# Patient Record
(demographics unavailable — no encounter records)

---

## 2024-10-09 NOTE — PHYSICAL EXAM
[No Acute Distress] : no acute distress [No Respiratory Distress] : no respiratory distress  [Normal Rate] : normal rate  [Normal Affect] : the affect was normal [Normal Insight/Judgement] : insight and judgment were intact

## 2024-10-09 NOTE — HISTORY OF PRESENT ILLNESS
[FreeTextEntry1] : f/u elevated BP [de-identified] : very pleasant 41yo M presenting for follow up of his elevated BP reading. no acute concerns pt's CT chest showed a 0.6cm pulmonary nodule, nonspecific. they do recommend follow up in 3months a1c is elevated lipid panel abnormal

## 2024-10-30 NOTE — HISTORY OF PRESENT ILLNESS
[FreeTextEntry1] : f/u htn, weight management [de-identified] : Pt has been on Wegovy for about two weeks and is down about 2lbs. He is having some mild constipation. He is otherwise doing well. Has found that he has had a curbed appetite and no longer having cravings. He denies any acute complaints.

## 2024-10-30 NOTE — PHYSICAL EXAM
[No Acute Distress] : no acute distress [No Respiratory Distress] : no respiratory distress  [Normal Rate] : normal rate  [No Edema] : there was no peripheral edema [Normal Affect] : the affect was normal [Normal Insight/Judgement] : insight and judgment were intact

## 2024-12-11 NOTE — HISTORY OF PRESENT ILLNESS
[FreeTextEntry1] : weight management blood pressure [de-identified] : currently on 0.5 of wegovy. will be starting 1.0 next week. tolerating med well.  breakfast: coffee milk and 1 sugar lunch: cereal bar  dinner: rice, chicken waller and veggies  snack: nothing drinking only water  not hungry until after 1   exercise: none

## 2024-12-18 NOTE — REASON FOR VISIT
[Patient preference] : as per patient preference [Telehealth (audio & video) - Individual/Group] : This visit was provided via telehealth using real-time 2-way audio visual technology. [Other Location: e.g. Home (Enter Location, City,State)___] : The provider was located at [unfilled]. [Home] : The patient, [unfilled], was located at home, [unfilled], at the time of the visit. [Patient's space is appropriate for telehealth and maintains privacy/confidentiality.] : Patient's space is appropriate for telehealth and maintains privacy/confidentiality. [Participant(s) identity verified] : Participant(s) identity verified. [Verbal consent obtained from patient/other participant(s)] : Verbal consent for telehealth/telephonic services obtained from patient/other participant(s) [Patient] : Patient [FreeTextEntry1] : bipolar disorder

## 2024-12-18 NOTE — PHYSICAL EXAM
[None] : none [Average] : average [Cooperative] : cooperative [Euthymic] : euthymic [Full] : full [Clear] : clear [Linear/Goal Directed] : linear/goal directed [None Reported] : none reported [WNL] : within normal limits [FreeTextEntry2] : not observed, he was sitting at a desk during the appointment [FreeTextEntry7] : No SI/HI

## 2024-12-18 NOTE — HISTORY OF PRESENT ILLNESS
[FreeTextEntry1] : Patient states he is continuing to do well since last visit.  Patient denied any symptoms of depression, birdie, hypomania or psychosis since last visit.   Patient reported he is sleeping well, and appetite is good. Some days he feels exhausted by end of the day but getting adequate sleep and also waking up feeling refreshed.  He reported no new medical issues.  He is due for annual physical exam and repeat blood tests- pending scheduling States he has been more physically active and exercising more.  He is not experiencing any motor side effects from Vraylar at this time and understands metabolic side effects from Vraylar.  Reported adherence to med regimen. Denied excess ETOH use/abuse and or any other illicit drug use including cannabis.   [FreeTextEntry3] : Patient had tried Abilify-not effective and was on Depakote and Zyprexa- gained a lot of weight. He was med free for a few eyars and when he had a relapse, was started on Vraylar in 2016 and he is on this current dose since no manic or depressive episodes since mid 2018.  He may have tried Latuda but only for 2 weeks, and did not remember why it was stopped, either side effects or insurance coverage issue (likely no generic at that time). Hx of psychosis with past manic episodes, no psychosis since 2009.

## 2024-12-18 NOTE — DISCUSSION/SUMMARY
[FreeTextEntry1] : The patient is 37 yo man with hx of first episode of birdie and psychosis in 2009, and mixed episode in 2016, stable on current meds.   5/11/2021: Patient remained stable with no symptoms of depression, birdie, hypomania, psychosis. Considering Vraylar is a second generation antipsychotic with long-term metabolic and motor side effects and patient is stable at this time we discussed possibility of switching over to lamotrigine for bipolar maintenance.  Considering the next 2 months will be more stressful with few life changes both he and his wife want to wait before starting the change.  We discussed adding on lamotrigine and then gradually tapering off Vraylar.  8/17/2021: Patient continues to remain stable on Vraylar, coping with various life changes, reports no symptoms of depression, birdie, hypomania, psychosis since last visit.    States he did not start taking lamotrigine yet and reports that at this time he would like to continue Vraylar until they are settled in the new home for a few months.  11/17/2021: Patient reports he is coping with various life changes and stressors without any relapse of symptoms of depression, birdie or hypomania or psychosis since last visit. We discussed today again the risks and benefits of long-term use of second-generation antipsychotic medications, more immediate risk being the metabolic side effects and the long-term risk of tardive dyskinesia.  Patient reports he feels that he is doing well on Vraylar and is concerned about Lyle Oscar syndrome from lamotrigine, reports he had seen some patients in his practice who had Silva-Oscar syndrome.  Patient reports he would like to work on lifestyle changes first to manage any metabolic risk from the medication and continue to monitor for motor side effects from Vraylar.  2/16/2022: Patient continues to remain stable with no symptoms of depression, birdie or hypomania since last visit.  He reports some fluctuating levels of motivation with self-care, understands needs to be more proactive and reports he has started doing so.  He reports he understands the risks and benefits of long-term use of second-generation antipsychotics including more immediate risk of metabolic side effects and long-term risk of tardive dyskinesia and considering benefits of being on Vraylar outweigh the risks at this time he wants to continue taking Vraylar.  6/1/2022: Patient remains psychiatrically stable with no symptoms of depression, birdie or hypomania since last visit.  Patient has no motor adverse effects at this time from Vraylar and understands the risk of metabolic side effects and continues to make lifestyle changes and will be following up with his primary care physician this year.  He wants to continue taking Vraylar at this time.  09/28/2022: Patient continues to remain stable on current medication with no symptoms of depression, birdie or hypomania since last visit. Patient reported that he has not been experiencing any side effects from Vraylar and he feels that current medication is keeping him stable and would like to continue the same medication.   2/15/2023: Patient reported no symptoms of depression, birdie or hypomania since last visit, continues to remain stable on current medication, and prefers to continue the same med  5/18/2023: Patient continues remain stable,  no symptoms of depression, birdie or hypomania since last visit, continues to remain stable on current medication, and prefers to continue the same med  12/13/2023: Patient is doing well, no symptoms of depression, birdie or hypomania since last visit.  Patient continues to remain stable on current medications regimen understands metabolic side effects from Vraylar and also motor side effects.  Patient states he does feel that the benefits outweigh the risks and wants to continue taking Vraylar same dosage at this time.  3/6/2024: Patient continues to remain stable with the bipolar disorder in remission no symptoms of depression, birdie or hypomania or psychosis since last visit.  Patient has been taking Vraylar 3 mg/day since mid 2018 and due to change in insurance now it needs prior authorization which we will pursue, and in case we need to consider alternate medication choices we discussed Latuda and or Lamictal to be considered.   5/21/2024: Patient continues to remain stable with the bipolar disorder in remission no symptoms of depression, birdie or hypomania or psychosis since last visit and wants to continue current medication regimen.   09/4/2024: Patient continues to remain stable with the bipolar disorder in remission since last visit and prefers to continue same med 12/18/2024:

## 2024-12-18 NOTE — PLAN
[Medication education provided] : Medication education provided. [Rationale for medication choices, possible risks/precautions, benefits, alternative treatment choices, and consequences of non-treatment discussed] : Rationale for medication choices, possible risks/precautions, benefits, alternative treatment choices, and consequences of non-treatment discussed with patient/family/caregiver  [FreeTextEntry5] : Psychoeducation and supportive therapy provided and discussed rationale for recommended meds  Advise patient schedule CPE soon-monitor for lipid profile and HGBA1C Medication: Vraylar 3 mg/day.  Educated patient of importance of remaining abstinent from drugs and alcohol.  Emergency procedures were discussed: pt. educated to call 911 or go to nearest ER for worsening of symptoms/suicidal/homicidal ideation.  RTC in 3 months or earlier as needed  Patient given opportunity to ask questions, and their questions were answered, and they expressed understanding and agreement with above plan.

## 2025-01-15 NOTE — HISTORY OF PRESENT ILLNESS
[FreeTextEntry1] : f/u  dec u/o [de-identified] : This is a 40-year-old male with a past medical history significant for obesity and hypertension presenting for follow-up.  He is tolerating his blood pressure medications well. He does note some decrease in urination. He feels he is maintaining adequate hydration and only passing urine about twice a day which is not typical for him. He is still on just 1.0 of his wegovy and is starting 1.7 this week

## 2025-02-05 NOTE — HISTORY OF PRESENT ILLNESS
[FreeTextEntry1] : follow up weight management [de-identified] : worsened reflux on increased dose

## 2025-04-30 NOTE — HISTORY OF PRESENT ILLNESS
[FreeTextEntry1] : Follow-up weight management [de-identified] : 41-year-old male with past medical history significant hypertension, obesity presents for follow-up weight management.  He is currently being managed on 1.7 mg of Wegovy.  He is down 13 pounds since last visit.  He is tolerating medication well.  No acute concerns.  Breakfast: egg, slice of toast  Lunch: nothing  Dinner: egg waller, roti  snack: nothing   Exercise: gym 2-3 times a week

## 2025-04-30 NOTE — HISTORY OF PRESENT ILLNESS
[FreeTextEntry1] : Follow-up weight management [de-identified] : 41-year-old male with past medical history significant hypertension, obesity presents for follow-up weight management.  He is currently being managed on 1.7 mg of Wegovy.  He is down 13 pounds since last visit.  He is tolerating medication well.  No acute concerns.  Breakfast: egg, slice of toast  Lunch: nothing  Dinner: egg waller, roti  snack: nothing   Exercise: gym 2-3 times a week

## 2025-05-28 NOTE — DISCUSSION/SUMMARY
[FreeTextEntry1] : The patient is 37 yo man with hx of first episode of birdie and psychosis in 2009, and mixed episode in 2016, stable on current meds.   5/11/2021: Patient remained stable with no symptoms of depression, birdie, hypomania, psychosis. Considering Vraylar is a second generation antipsychotic with long-term metabolic and motor side effects and patient is stable at this time we discussed possibility of switching over to lamotrigine for bipolar maintenance.  Considering the next 2 months will be more stressful with few life changes both he and his wife want to wait before starting the change.  We discussed adding on lamotrigine and then gradually tapering off Vraylar.  8/17/2021: Patient continues to remain stable on Vraylar, coping with various life changes, reports no symptoms of depression, birdie, hypomania, psychosis since last visit.    States he did not start taking lamotrigine yet and reports that at this time he would like to continue Vraylar until they are settled in the new home for a few months.  11/17/2021: Patient reports he is coping with various life changes and stressors without any relapse of symptoms of depression, birdie or hypomania or psychosis since last visit. We discussed today again the risks and benefits of long-term use of second-generation antipsychotic medications, more immediate risk being the metabolic side effects and the long-term risk of tardive dyskinesia.  Patient reports he feels that he is doing well on Vraylar and is concerned about Lyle Oscar syndrome from lamotrigine, reports he had seen some patients in his practice who had Silva-Oscar syndrome.  Patient reports he would like to work on lifestyle changes first to manage any metabolic risk from the medication and continue to monitor for motor side effects from Vraylar.  2/16/2022: Patient continues to remain stable with no symptoms of depression, birdie or hypomania since last visit.  He reports some fluctuating levels of motivation with self-care, understands needs to be more proactive and reports he has started doing so.  He reports he understands the risks and benefits of long-term use of second-generation antipsychotics including more immediate risk of metabolic side effects and long-term risk of tardive dyskinesia and considering benefits of being on Vraylar outweigh the risks at this time he wants to continue taking Vraylar.  6/1/2022: Patient remains psychiatrically stable with no symptoms of depression, birdie or hypomania since last visit.  Patient has no motor adverse effects at this time from Vraylar and understands the risk of metabolic side effects and continues to make lifestyle changes and will be following up with his primary care physician this year.  He wants to continue taking Vraylar at this time.  09/28/2022: Patient continues to remain stable on current medication with no symptoms of depression, birdie or hypomania since last visit. Patient reported that he has not been experiencing any side effects from Vraylar and he feels that current medication is keeping him stable and would like to continue the same medication.   2/15/2023: Patient reported no symptoms of depression, birdie or hypomania since last visit, continues to remain stable on current medication, and prefers to continue the same med  5/18/2023: Patient continues remain stable,  no symptoms of depression, birdie or hypomania since last visit, continues to remain stable on current medication, and prefers to continue the same med  12/13/2023: Patient is doing well, no symptoms of depression, birdie or hypomania since last visit.  Patient continues to remain stable on current medications regimen understands metabolic side effects from Vraylar and also motor side effects.  Patient states he does feel that the benefits outweigh the risks and wants to continue taking Vraylar same dosage at this time.  3/6/2024: Patient continues to remain stable with the bipolar disorder in remission no symptoms of depression, birdie or hypomania or psychosis since last visit.  Patient has been taking Vraylar 3 mg/day since mid 2018 and due to change in insurance now it needs prior authorization which we will pursue, and in case we need to consider alternate medication choices we discussed Latuda and or Lamictal to be considered.   5/21/2024: Patient continues to remain stable with the bipolar disorder in remission no symptoms of depression, birdie or hypomania or psychosis since last visit and wants to continue current medication regimen.   09/4/2024: Patient continues to remain stable with the bipolar disorder in remission since last visit and prefers to continue same med 12/18/2024: Patient is overall doing well with good control of bipolar disorder symptoms on the current medication regimen, has maintained stability without manic or hypomanic episodes for many years. Risk of relapse is high when dose that has maintained bipolar disorder is lowered. Recommended to continue same dosage and monitor for symptom stability. 5/27/2025:  Patient is overall doing well with good control of bipolar disorder symptoms on the current medication regimen.

## 2025-05-28 NOTE — DISCUSSION/SUMMARY
[FreeTextEntry1] : The patient is 35 yo man with hx of first episode of birdie and psychosis in 2009, and mixed episode in 2016, stable on current meds.   5/11/2021: Patient remained stable with no symptoms of depression, birdie, hypomania, psychosis. Considering Vraylar is a second generation antipsychotic with long-term metabolic and motor side effects and patient is stable at this time we discussed possibility of switching over to lamotrigine for bipolar maintenance.  Considering the next 2 months will be more stressful with few life changes both he and his wife want to wait before starting the change.  We discussed adding on lamotrigine and then gradually tapering off Vraylar.  8/17/2021: Patient continues to remain stable on Vraylar, coping with various life changes, reports no symptoms of depression, birdie, hypomania, psychosis since last visit.    States he did not start taking lamotrigine yet and reports that at this time he would like to continue Vraylar until they are settled in the new home for a few months.  11/17/2021: Patient reports he is coping with various life changes and stressors without any relapse of symptoms of depression, birdie or hypomania or psychosis since last visit. We discussed today again the risks and benefits of long-term use of second-generation antipsychotic medications, more immediate risk being the metabolic side effects and the long-term risk of tardive dyskinesia.  Patient reports he feels that he is doing well on Vraylar and is concerned about Lyle Oscar syndrome from lamotrigine, reports he had seen some patients in his practice who had Silva-Oscar syndrome.  Patient reports he would like to work on lifestyle changes first to manage any metabolic risk from the medication and continue to monitor for motor side effects from Vraylar.  2/16/2022: Patient continues to remain stable with no symptoms of depression, birdie or hypomania since last visit.  He reports some fluctuating levels of motivation with self-care, understands needs to be more proactive and reports he has started doing so.  He reports he understands the risks and benefits of long-term use of second-generation antipsychotics including more immediate risk of metabolic side effects and long-term risk of tardive dyskinesia and considering benefits of being on Vraylar outweigh the risks at this time he wants to continue taking Vraylar.  6/1/2022: Patient remains psychiatrically stable with no symptoms of depression, birdie or hypomania since last visit.  Patient has no motor adverse effects at this time from Vraylar and understands the risk of metabolic side effects and continues to make lifestyle changes and will be following up with his primary care physician this year.  He wants to continue taking Vraylar at this time.  09/28/2022: Patient continues to remain stable on current medication with no symptoms of depression, birdie or hypomania since last visit. Patient reported that he has not been experiencing any side effects from Vraylar and he feels that current medication is keeping him stable and would like to continue the same medication.   2/15/2023: Patient reported no symptoms of depression, birdie or hypomania since last visit, continues to remain stable on current medication, and prefers to continue the same med  5/18/2023: Patient continues remain stable,  no symptoms of depression, birdie or hypomania since last visit, continues to remain stable on current medication, and prefers to continue the same med  12/13/2023: Patient is doing well, no symptoms of depression, birdie or hypomania since last visit.  Patient continues to remain stable on current medications regimen understands metabolic side effects from Vraylar and also motor side effects.  Patient states he does feel that the benefits outweigh the risks and wants to continue taking Vraylar same dosage at this time.  3/6/2024: Patient continues to remain stable with the bipolar disorder in remission no symptoms of depression, birdie or hypomania or psychosis since last visit.  Patient has been taking Vraylar 3 mg/day since mid 2018 and due to change in insurance now it needs prior authorization which we will pursue, and in case we need to consider alternate medication choices we discussed Latuda and or Lamictal to be considered.   5/21/2024: Patient continues to remain stable with the bipolar disorder in remission no symptoms of depression, birdie or hypomania or psychosis since last visit and wants to continue current medication regimen.   09/4/2024: Patient continues to remain stable with the bipolar disorder in remission since last visit and prefers to continue same med 12/18/2024: Patient is overall doing well with good control of bipolar disorder symptoms on the current medication regimen, has maintained stability without manic or hypomanic episodes for many years. Risk of relapse is high when dose that has maintained bipolar disorder is lowered. Recommended to continue same dosage and monitor for symptom stability. 5/27/2025:  Patient is overall doing well with good control of bipolar disorder symptoms on the current medication regimen.

## 2025-05-28 NOTE — PLAN
Referred by: Yahaira Flores NP; Medical Diagnosis (from order):    Diagnosis Information      Diagnosis    726.2 (ICD-9-CM) - M75.41 (ICD-10-CM) - Impingement syndrome of right shoulder                Daily Treatment Note    Visit:  6   Patient alert and oriented X3.    SUBJECTIVE                                                                                                             Patient reporting soreness in bilateral cervical spine and bilateral upper traps. Overall improvement in right shoulder    Pain / Symptoms:  Pain/symptom is: constant  Pain rating (out of 10): Current: 4 ; Best: 4; Worst: 7Location: Bilateral cervical spine and upper traps   Quality / Description: ache, sore.  Alleviating Factors: heat, massage.   Progression since onset: improved    OBJECTIVE                                                                                                                     Range of Motion (ROM)   (degrees unless noted; active unless noted; norms in ( ); negative=lacking to 0, positive=beyond 0)   Shoulder:     - Flexion (180):        • Left: 150         • Right: 150     - Extension (50):        • Left: 60         • Right: 60     - Abduction (180):        • Left: 155         • Right: 155  Functional ROM:     - Place hand on opposite shoulder:  • Left: Normal  • Right: Normal      - Touch top of head:  • Left: Normal  • Right: Normal      - Place hand behind neck:  • Left: Normal  • Right: Normal      - Place hand behind back:  • Left: Normal  • Right: Normal      - Over head reach:  • Left: Normal  • Right: Normal    Cervical WNL  Details / Comments: Flexion AROM with scapula stabilized      Palpation:   Comments / Details: Tender to palpation in lower to mid cervical spine especially right side; multiple latent trigger points in interscapular musculature    Muscle Flexibility:   - Sub Occipitals: Left: normal Right:  Normal  - Levator Scapulae: Left: minimal limitation Right: minimal limitation  -  Pectoralis Major: Left: minimal limitation Right: minimal limitation  - Pectoralis Minor: Left: moderate limitation Right: moderate limitation  - Latissimus Dorsi: Left: moderate limitation Right: moderate limitation  - Scalenes: Left: normal Right: normal  - Upper Trapezius: Left: minimal limitation Right: normal       Comments / Details: Outcome Measures:   Quick Disabilities of the Arm, Shoulder and Hand: QuickDash Total Score: 9.09  (scored 0-100; a higher score indicates greater disability) see flowsheet for additional documentation      TREATMENT                                                                                                                  Therapeutic Exercise:  Objective measures   Review select HEP for understanding/technique  -seated bilateral ER with loop  -chin tucks  -trial of chin tucks with cervical extension        Manual Therapy:  Prone STM to thoracic and cervical paraspinals/interscapular muscles/bilateral posterior cuff    Supine STM to pec area with grade III posterior glide  Issued tennis ball within stockinette for trigger points         Skilled input: verbal instruction/cues, tactile instruction/cues, posture correction and facilitation  education/instruction on: posture education/HEP review  instruction/cues for: posture    Writer verbally educated and received verbal consent for hand placement, positioning of patient, and techniques to be performed today from patient for clothing adjustments for techniques, therapist position for techniques and hand placement and palpation for techniques as described above and how they are pertinent to the patient's plan of care.    Home Exercise Program:   Access Code: [email or web address]   Has red loop and green band    Exercises   Supine Lower Trunk Rotation- 3 sets- 20 seconds hold- 1x daily - 7x weekly   Standing Row with Resistance- 15 reps- 2 sets- 1x daily- 7x weekly   Cervical Retraction with Overpressure- 10 reps- 1 sets- 5 seconds hold- 1x  daily- 7x weekly   Shoulder extension with resistance - Neutral- 15 reps- 2 sets- 1x daily- 7x weekly   Standing Shoulder External Rotation with Resistance- 10 reps- 2 sets- 3 second hold- 1x daily- 3x weekly        ASSESSMENT                                                                                                             Good progress in posture, flexibility and cervical/bilateral GHJ AROM/will assess strength next session;   Pain/symptoms after session: 2    Patient Education:   Results of above outlined education: Verbalizes understanding and Demonstrates understanding      PLAN                                                                                                                           Suggestions for next session as indicated: Progress per plan of care  Review complete HEP for technique and understanding   STM/MT as above   Offer postural tape if helpful-have used Mcconnel rigid X tape and kinesiotape tape      GOALS                                                                                                                           Decrease pain/symptoms to < or =/10  Improve involved flexion/scaption/abduction ROM to > or =165 bilaterally  The above improvements in impairments to assist in obtaining goals listed below  Long Term Goals: to be met be end of plan of care  1. Patient will lift overhead 10 repetitions x 10# for completion of work tests with minimal difficulty-PROGRESSING  2. Patient will complete independent dressing with proper scapulohumeral rhythm GOAL MET  3. Patient will  and lift a light grocery bag,  steering wheel, perform light home/yard maintenance tasks without reported pain GOAL MET  4. Patient will be independent with progressed and modified home exercise program. GOAL MET       Therapy procedure time and total treatment time can be found documented on the Time Entry flowsheet   [Medication education provided] : Medication education provided. [Rationale for medication choices, possible risks/precautions, benefits, alternative treatment choices, and consequences of non-treatment discussed] : Rationale for medication choices, possible risks/precautions, benefits, alternative treatment choices, and consequences of non-treatment discussed with patient/family/caregiver  [FreeTextEntry5] : Psychoeducation and supportive therapy provided and discussed rationale for recommended meds  Medication: Vraylar 3 mg/day.  Educated patient of importance of remaining abstinent from drugs and alcohol.  Emergency procedures were discussed: pt. educated to call 911 or go to nearest ER for worsening of symptoms/suicidal/homicidal ideation.  RTC in 3 months or earlier as needed  Patient given opportunity to ask questions, and their questions were answered, and they expressed understanding and agreement with above plan.

## 2025-05-28 NOTE — HISTORY OF PRESENT ILLNESS
[FreeTextEntry1] : Patient reported he is continuing to do well since last visit. He reported stable mood, denied any symptoms of depression, birdie, or hypomania. No psychosis reported since last visit. Patient reported his sleep is pretty good.  No motor side effects from Vryalar reported. Patient understands the metabolic side effects from Vraylar, and he is on Wegovy and antihypertensive meds and states he is loosing weight and his BP is in good control.  Reported adherence to med regimen Denied excess ETOH use/abuse and or any other illicit drug use including cannabis. No new medical issues, no new medication since last visit Work and home life stable.  [FreeTextEntry3] : Patient had tried Abilify-not effective and was on Depakote and Zyprexa- gained a lot of weight. He was med free for a few eyars and when he had a relapse, was started on Vraylar in 2016 and he is on this current dose since no manic or depressive episodes since mid 2018.  He may have tried Latuda but only for 2 weeks, and did not remember why it was stopped, either side effects or insurance coverage issue (likely no generic at that time). Hx of psychosis with past manic episodes, no psychosis since 2009.

## 2025-05-28 NOTE — PLAN
[Medication education provided] : Medication education provided. [Rationale for medication choices, possible risks/precautions, benefits, alternative treatment choices, and consequences of non-treatment discussed] : Rationale for medication choices, possible risks/precautions, benefits, alternative treatment choices, and consequences of non-treatment discussed with patient/family/caregiver  [FreeTextEntry5] : Psychoeducation and supportive therapy provided and discussed rationale for recommended meds  Medication: Vraylar 3 mg/day.  Educated patient of importance of remaining abstinent from drugs and alcohol.  Emergency procedures were discussed: pt. educated to call 911 or go to nearest ER for worsening of symptoms/suicidal/homicidal ideation.  RTC in 3 months or earlier as needed  Patient given opportunity to ask questions, and their questions were answered, and they expressed understanding and agreement with above plan.

## 2025-07-16 NOTE — PHYSICAL EXAM
[No Acute Distress] : no acute distress [Clear to Auscultation] : lungs were clear to auscultation bilaterally [Normal S1, S2] : normal S1 and S2 [Normal Affect] : the affect was normal [Normal Insight/Judgement] : insight and judgment were intact

## 2025-07-16 NOTE — HISTORY OF PRESENT ILLNESS
[FreeTextEntry1] : follow up - weight management  [de-identified] : 41-year-old male with past medical history significant hypertension, obesity presents for follow-up weight management.  He is currently being managed on 2.4 mg of Wegovy.  He is down ** pounds since last visit.  He is tolerating medication well.  No acute concerns.  Breakfast: quarter of a bagel , coffee Lunch:  coffee Dinner: pasta  snack: nothing  Exercise: gym 2-3 times a week